# Patient Record
Sex: MALE | Race: WHITE | NOT HISPANIC OR LATINO | URBAN - METROPOLITAN AREA
[De-identification: names, ages, dates, MRNs, and addresses within clinical notes are randomized per-mention and may not be internally consistent; named-entity substitution may affect disease eponyms.]

---

## 2017-10-19 ENCOUNTER — ON CAMPUS - OUTPATIENT (AMBULATORY)
Dept: URBAN - METROPOLITAN AREA HOSPITAL 77 | Facility: HOSPITAL | Age: 72
End: 2017-10-19
Payer: MEDICARE

## 2017-10-19 DIAGNOSIS — Z12.11 ENCOUNTER FOR SCREENING FOR MALIGNANT NEOPLASM OF COLON: ICD-10-CM

## 2017-10-19 DIAGNOSIS — K64.8 OTHER HEMORRHOIDS: ICD-10-CM

## 2017-10-19 DIAGNOSIS — K57.30 DIVERTICULOSIS OF LARGE INTESTINE WITHOUT PERFORATION OR ABS: ICD-10-CM

## 2017-10-19 PROCEDURE — G0121 COLON CA SCRN NOT HI RSK IND: HCPCS | Performed by: INTERNAL MEDICINE

## 2023-07-27 ENCOUNTER — HOSPITAL ENCOUNTER (OUTPATIENT)
Dept: CARDIOLOGY | Facility: HOSPITAL | Age: 78
Discharge: HOME | End: 2023-07-27
Payer: MEDICARE

## 2023-07-27 DIAGNOSIS — L97.821 NON-PRESSURE CHRONIC ULCER OF OTHER PART OF LEFT LOWER LEG LIMITED TO BREAKDOWN OF SKIN: ICD-10-CM

## 2023-07-27 DIAGNOSIS — L97.811 NON-PRESSURE CHRONIC ULCER OF OTHER PART OF RIGHT LOWER LEG LIMITED TO BREAKDOWN OF SKIN: ICD-10-CM

## 2023-07-27 LAB
BH CV LOWER ARTERIAL LEFT ABI RATIO: NORMAL
BH CV LOWER ARTERIAL LEFT DORSALIS PEDIS SYS MAX: NORMAL
BH CV LOWER ARTERIAL LEFT GREAT TOE SYS MAX: 110
BH CV LOWER ARTERIAL LEFT POST TIBIAL SYS MAX: NORMAL
BH CV LOWER ARTERIAL LEFT TBI RATIO: 1.12
BH CV LOWER ARTERIAL RIGHT ABI RATIO: 1.69
BH CV LOWER ARTERIAL RIGHT DORSALIS PEDIS SYS MAX: 166
BH CV LOWER ARTERIAL RIGHT GREAT TOE SYS MAX: 126
BH CV LOWER ARTERIAL RIGHT POST TIBIAL SYS MAX: NORMAL
BH CV LOWER ARTERIAL RIGHT TBI RATIO: 1.29
UPPER ARTERIAL LEFT ARM BRACHIAL SYS MAX: 98 MMHG
UPPER ARTERIAL RIGHT ARM BRACHIAL SYS MAX: 95 MMHG

## 2023-07-27 PROCEDURE — 93922 UPR/L XTREMITY ART 2 LEVELS: CPT

## 2023-07-28 ENCOUNTER — OFFICE VISIT (OUTPATIENT)
Dept: WOUND CARE | Facility: HOSPITAL | Age: 78
End: 2023-07-28
Payer: MEDICARE

## 2023-07-28 PROCEDURE — G0463 HOSPITAL OUTPT CLINIC VISIT: HCPCS

## 2023-08-04 ENCOUNTER — OFFICE VISIT (OUTPATIENT)
Dept: WOUND CARE | Facility: HOSPITAL | Age: 78
End: 2023-08-04
Payer: MEDICARE

## 2023-08-04 PROCEDURE — G0463 HOSPITAL OUTPT CLINIC VISIT: HCPCS

## 2023-09-11 ENCOUNTER — OFFICE VISIT (OUTPATIENT)
Dept: WOUND CARE | Facility: HOSPITAL | Age: 78
End: 2023-09-11
Payer: MEDICARE

## 2023-09-11 PROCEDURE — G0463 HOSPITAL OUTPT CLINIC VISIT: HCPCS

## 2023-09-25 ENCOUNTER — OFFICE VISIT (OUTPATIENT)
Dept: WOUND CARE | Facility: HOSPITAL | Age: 78
End: 2023-09-25
Payer: MEDICARE

## 2023-09-25 PROCEDURE — G0463 HOSPITAL OUTPT CLINIC VISIT: HCPCS

## 2023-10-09 ENCOUNTER — OFFICE VISIT (OUTPATIENT)
Dept: WOUND CARE | Facility: HOSPITAL | Age: 78
End: 2023-10-09
Payer: MEDICARE

## 2023-10-09 DIAGNOSIS — L97.821 NON-PRESSURE CHRONIC ULCER OF OTHER PART OF LEFT LOWER LEG LIMITED TO BREAKDOWN OF SKIN: ICD-10-CM

## 2023-10-09 DIAGNOSIS — I87.2 VENOUS INSUFFICIENCY (CHRONIC) (PERIPHERAL): ICD-10-CM

## 2023-10-09 DIAGNOSIS — L97.811 NON-PRESSURE CHRONIC ULCER OF OTHER PART OF RIGHT LOWER LEG LIMITED TO BREAKDOWN OF SKIN: Primary | ICD-10-CM

## 2023-10-16 ENCOUNTER — OFFICE VISIT (OUTPATIENT)
Dept: WOUND CARE | Facility: HOSPITAL | Age: 78
End: 2023-10-16
Payer: MEDICARE

## 2023-10-30 ENCOUNTER — OFFICE VISIT (OUTPATIENT)
Dept: WOUND CARE | Facility: HOSPITAL | Age: 78
End: 2023-10-30
Payer: MEDICARE

## 2023-10-30 DIAGNOSIS — I87.2 VENOUS INSUFFICIENCY (CHRONIC) (PERIPHERAL): ICD-10-CM

## 2023-10-30 DIAGNOSIS — L97.821 NON-PRESSURE CHRONIC ULCER OF OTHER PART OF LEFT LOWER LEG LIMITED TO BREAKDOWN OF SKIN: Primary | ICD-10-CM

## 2023-11-20 ENCOUNTER — OFFICE VISIT (OUTPATIENT)
Dept: WOUND CARE | Facility: HOSPITAL | Age: 78
End: 2023-11-20
Payer: MEDICARE

## 2023-12-04 ENCOUNTER — OFFICE VISIT (OUTPATIENT)
Dept: WOUND CARE | Facility: HOSPITAL | Age: 78
End: 2023-12-04
Payer: MEDICARE

## 2023-12-04 DIAGNOSIS — I87.2 VENOUS INSUFFICIENCY (CHRONIC) (PERIPHERAL): ICD-10-CM

## 2023-12-04 DIAGNOSIS — L85.3 XEROSIS CUTIS: ICD-10-CM

## 2023-12-04 DIAGNOSIS — L97.811 NON-PRESSURE CHRONIC ULCER OF OTHER PART OF RIGHT LOWER LEG LIMITED TO BREAKDOWN OF SKIN: ICD-10-CM

## 2023-12-04 DIAGNOSIS — L97.821 NON-PRESSURE CHRONIC ULCER OF OTHER PART OF LEFT LOWER LEG LIMITED TO BREAKDOWN OF SKIN: Primary | ICD-10-CM

## 2023-12-04 PROCEDURE — 99213 OFFICE O/P EST LOW 20 MIN: CPT

## 2023-12-18 ENCOUNTER — OFFICE VISIT (OUTPATIENT)
Dept: WOUND CARE | Facility: HOSPITAL | Age: 78
End: 2023-12-18
Payer: MEDICARE

## 2024-01-10 ENCOUNTER — OFFICE VISIT (OUTPATIENT)
Dept: WOUND CARE | Facility: HOSPITAL | Age: 79
End: 2024-01-10
Payer: MEDICARE

## 2024-01-17 ENCOUNTER — OFFICE VISIT (OUTPATIENT)
Dept: WOUND CARE | Facility: HOSPITAL | Age: 79
End: 2024-01-17
Payer: MEDICARE

## 2024-02-07 ENCOUNTER — OFFICE VISIT (OUTPATIENT)
Dept: WOUND CARE | Facility: HOSPITAL | Age: 79
End: 2024-02-07
Payer: MEDICARE

## 2024-02-23 ENCOUNTER — OFFICE VISIT (OUTPATIENT)
Dept: WOUND CARE | Facility: HOSPITAL | Age: 79
End: 2024-02-23
Payer: MEDICARE

## 2024-03-08 ENCOUNTER — OFFICE VISIT (OUTPATIENT)
Dept: WOUND CARE | Facility: HOSPITAL | Age: 79
End: 2024-03-08
Payer: MEDICARE

## 2024-03-22 ENCOUNTER — OFFICE VISIT (OUTPATIENT)
Dept: WOUND CARE | Facility: HOSPITAL | Age: 79
End: 2024-03-22
Payer: MEDICARE

## 2024-04-05 ENCOUNTER — OFFICE VISIT (OUTPATIENT)
Dept: WOUND CARE | Facility: HOSPITAL | Age: 79
End: 2024-04-05
Payer: MEDICARE

## 2024-04-05 PROCEDURE — G0463 HOSPITAL OUTPT CLINIC VISIT: HCPCS

## 2024-04-19 ENCOUNTER — OFFICE VISIT (OUTPATIENT)
Dept: WOUND CARE | Facility: HOSPITAL | Age: 79
End: 2024-04-19
Payer: MEDICARE

## 2024-05-10 ENCOUNTER — OFFICE VISIT (OUTPATIENT)
Dept: WOUND CARE | Facility: HOSPITAL | Age: 79
End: 2024-05-10
Payer: MEDICARE

## 2024-05-24 ENCOUNTER — OFFICE VISIT (OUTPATIENT)
Dept: WOUND CARE | Facility: HOSPITAL | Age: 79
End: 2024-05-24
Payer: MEDICARE

## 2024-05-24 PROCEDURE — G0463 HOSPITAL OUTPT CLINIC VISIT: HCPCS

## 2024-06-14 ENCOUNTER — OFFICE VISIT (OUTPATIENT)
Dept: WOUND CARE | Facility: HOSPITAL | Age: 79
End: 2024-06-14
Payer: MEDICARE

## 2024-07-08 ENCOUNTER — HOSPITAL ENCOUNTER (INPATIENT)
Facility: HOSPITAL | Age: 79
LOS: 1 days | Discharge: HOME | End: 2024-07-10
Attending: EMERGENCY MEDICINE | Admitting: INTERNAL MEDICINE
Payer: MEDICARE

## 2024-07-08 DIAGNOSIS — R29.6 MULTIPLE FALLS: Primary | ICD-10-CM

## 2024-07-08 DIAGNOSIS — E86.0 DEHYDRATION: ICD-10-CM

## 2024-07-08 DIAGNOSIS — R26.81 UNSTEADY GAIT: ICD-10-CM

## 2024-07-08 DIAGNOSIS — R29.6 RECURRENT FALLS: ICD-10-CM

## 2024-07-08 DIAGNOSIS — R55 NEAR SYNCOPE: ICD-10-CM

## 2024-07-08 LAB
ALBUMIN SERPL-MCNC: 3.4 G/DL (ref 3.5–5.2)
ALBUMIN/GLOB SERPL: 1.1 G/DL
ALP SERPL-CCNC: 89 U/L (ref 39–117)
ALT SERPL W P-5'-P-CCNC: 23 U/L (ref 1–41)
ANION GAP SERPL CALCULATED.3IONS-SCNC: 9.5 MMOL/L (ref 5–15)
AST SERPL-CCNC: 33 U/L (ref 1–40)
BASOPHILS # BLD AUTO: 0.04 10*3/MM3 (ref 0–0.2)
BASOPHILS NFR BLD AUTO: 0.7 % (ref 0–1.5)
BILIRUB SERPL-MCNC: 1 MG/DL (ref 0–1.2)
BUN SERPL-MCNC: 22 MG/DL (ref 8–23)
BUN/CREAT SERPL: 17.1 (ref 7–25)
CALCIUM SPEC-SCNC: 8.7 MG/DL (ref 8.6–10.5)
CHLORIDE SERPL-SCNC: 106 MMOL/L (ref 98–107)
CO2 SERPL-SCNC: 24.5 MMOL/L (ref 22–29)
CREAT SERPL-MCNC: 1.29 MG/DL (ref 0.76–1.27)
DEPRECATED RDW RBC AUTO: 45.9 FL (ref 37–54)
EGFRCR SERPLBLD CKD-EPI 2021: 56.1 ML/MIN/1.73
EOSINOPHIL # BLD AUTO: 0.3 10*3/MM3 (ref 0–0.4)
EOSINOPHIL NFR BLD AUTO: 5.4 % (ref 0.3–6.2)
ERYTHROCYTE [DISTWIDTH] IN BLOOD BY AUTOMATED COUNT: 13.6 % (ref 12.3–15.4)
GEN 5 2HR TROPONIN T REFLEX: 80 NG/L
GLOBULIN UR ELPH-MCNC: 3.2 GM/DL
GLUCOSE SERPL-MCNC: 93 MG/DL (ref 65–99)
HCT VFR BLD AUTO: 41.9 % (ref 37.5–51)
HGB BLD-MCNC: 13.2 G/DL (ref 13–17.7)
IMM GRANULOCYTES # BLD AUTO: 0.02 10*3/MM3 (ref 0–0.05)
IMM GRANULOCYTES NFR BLD AUTO: 0.4 % (ref 0–0.5)
LYMPHOCYTES # BLD AUTO: 1.03 10*3/MM3 (ref 0.7–3.1)
LYMPHOCYTES NFR BLD AUTO: 18.4 % (ref 19.6–45.3)
MAGNESIUM SERPL-MCNC: 2.1 MG/DL (ref 1.6–2.4)
MCH RBC QN AUTO: 28.7 PG (ref 26.6–33)
MCHC RBC AUTO-ENTMCNC: 31.5 G/DL (ref 31.5–35.7)
MCV RBC AUTO: 91.1 FL (ref 79–97)
MONOCYTES # BLD AUTO: 0.45 10*3/MM3 (ref 0.1–0.9)
MONOCYTES NFR BLD AUTO: 8.1 % (ref 5–12)
NEUTROPHILS NFR BLD AUTO: 3.75 10*3/MM3 (ref 1.7–7)
NEUTROPHILS NFR BLD AUTO: 67 % (ref 42.7–76)
PLATELET # BLD AUTO: 157 10*3/MM3 (ref 140–450)
PMV BLD AUTO: 9.8 FL (ref 6–12)
POTASSIUM SERPL-SCNC: 3.3 MMOL/L (ref 3.5–5.2)
PROT SERPL-MCNC: 6.6 G/DL (ref 6–8.5)
QT INTERVAL: 438 MS
QTC INTERVAL: 477 MS
RBC # BLD AUTO: 4.6 10*6/MM3 (ref 4.14–5.8)
SODIUM SERPL-SCNC: 140 MMOL/L (ref 136–145)
TROPONIN T DELTA: -6 NG/L
TROPONIN T SERPL HS-MCNC: 86 NG/L
WBC NRBC COR # BLD AUTO: 5.59 10*3/MM3 (ref 3.4–10.8)

## 2024-07-08 PROCEDURE — 80053 COMPREHEN METABOLIC PANEL: CPT | Performed by: PHYSICIAN ASSISTANT

## 2024-07-08 PROCEDURE — 99284 EMERGENCY DEPT VISIT MOD MDM: CPT | Performed by: PHYSICIAN ASSISTANT

## 2024-07-08 PROCEDURE — 84484 ASSAY OF TROPONIN QUANT: CPT | Performed by: PHYSICIAN ASSISTANT

## 2024-07-08 PROCEDURE — 93005 ELECTROCARDIOGRAM TRACING: CPT | Performed by: PHYSICIAN ASSISTANT

## 2024-07-08 PROCEDURE — 36415 COLL VENOUS BLD VENIPUNCTURE: CPT

## 2024-07-08 PROCEDURE — 99285 EMERGENCY DEPT VISIT HI MDM: CPT

## 2024-07-08 PROCEDURE — 25010000002 ONDANSETRON PER 1 MG: Performed by: PHYSICIAN ASSISTANT

## 2024-07-08 PROCEDURE — 93010 ELECTROCARDIOGRAM REPORT: CPT | Performed by: PHYSICIAN ASSISTANT

## 2024-07-08 PROCEDURE — G0378 HOSPITAL OBSERVATION PER HR: HCPCS

## 2024-07-08 PROCEDURE — 85025 COMPLETE CBC W/AUTO DIFF WBC: CPT | Performed by: PHYSICIAN ASSISTANT

## 2024-07-08 PROCEDURE — 25810000003 SODIUM CHLORIDE 0.9 % SOLUTION: Performed by: EMERGENCY MEDICINE

## 2024-07-08 PROCEDURE — 25810000003 SODIUM CHLORIDE 0.9 % SOLUTION: Performed by: PHYSICIAN ASSISTANT

## 2024-07-08 PROCEDURE — 83735 ASSAY OF MAGNESIUM: CPT | Performed by: PHYSICIAN ASSISTANT

## 2024-07-08 RX ORDER — ONDANSETRON 2 MG/ML
4 INJECTION INTRAMUSCULAR; INTRAVENOUS ONCE
Status: COMPLETED | OUTPATIENT
Start: 2024-07-08 | End: 2024-07-08

## 2024-07-08 RX ADMIN — SODIUM CHLORIDE 1000 ML: 9 INJECTION, SOLUTION INTRAVENOUS at 14:24

## 2024-07-08 RX ADMIN — SODIUM CHLORIDE 1000 ML: 9 INJECTION, SOLUTION INTRAVENOUS at 12:54

## 2024-07-08 RX ADMIN — ONDANSETRON 4 MG: 2 INJECTION INTRAMUSCULAR; INTRAVENOUS at 17:36

## 2024-07-08 NOTE — ED NOTES
RN rounded on pt at this time, sister asked for pt to be given ice water, this was provided. Another family member is present at this time, asking for updates on pt condition. Education provided. Notified that we are waiting on a bed to become available at [place].

## 2024-07-08 NOTE — FSED PROVIDER NOTE
EMERGENCY DEPARTMENT ENCOUNTER    Room Number:  02/02  Date seen:  7/8/2024  Time seen: 12:49 EDT  PCP: Josey Stovall APRN  Historian: Patient    Discussed/obtained information from independent historians: N/A    HPI:  Chief complaint: Multiple falls  A complete HPI/ROS/PMH/PSH/SH/FH are unobtainable due to: Nothing  Context:Lopez Mcdaniel is a 78 y.o. male significant past medical history of lymphedema, mild dementia, lives at home alone.  He presents to the ED with c/o 3 falls that occurred in the course the past week.  Patient states that one of the occasions he is tripped over her shoelaces.  He states that the second fall he thinks he attempted to stand up too quickly and became lightheaded causing him to fall.  Denies having a LOC with a lightheaded episode.  3 days ago.  The states he did hit his head in the process of the fall 3 days ago.  There was no LOC with head trauma, no nausea, no vomiting.  There was no chest pain or palpitations associated with the dizzy spell.  Daughter is present and states the patient appears to be at his baseline mentally.  He is currently not on any anticoagulant therapy.  He denies sustaining injury to the abdomen, chest wall, neck, mid back, lower back, ribs, upper and lower extremities with this fall.      External (non-ED) record review: Reviewing patient's chart he appears to be followed by the 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  His next appointment with the wound clinic is in 4 days.    Chronic or social conditions impacting care:    ALLERGIES  Sulfa antibiotics, Latex, and Wound dressing adhesive    PAST MEDICAL HISTORY  Active Ambulatory Problems     Diagnosis Date Noted    No Active Ambulatory Problems     Resolved Ambulatory Problems     Diagnosis Date Noted    No Resolved Ambulatory Problems     No Additional Past Medical History       PAST SURGICAL HISTORY  History reviewed. No  pertinent surgical history.    FAMILY HISTORY  History reviewed. No pertinent family history.    SOCIAL HISTORY  Social History     Socioeconomic History    Marital status:        REVIEW OF SYSTEMS  Review of Systems    All systems reviewed and negative except for those discussed in HPI.     PHYSICAL EXAM    I have reviewed the triage vital signs and nursing notes.  Vitals:    07/08/24 1259   BP: 99/65   Pulse: 93   Resp:    Temp:    SpO2:      Physical Exam    GENERAL: Frail, alert   HENT: Mucous membranes dry, abrasion to the right aspect of the forehead.  EYES: no scleral icterus, no hyphema or proptosis  NECK: no ROM limitations  CV: regular rhythm, regular rate  RESPIRATORY: normal effort  ABDOMEN: soft nontender  : deferred  MUSCULOSKELETAL: Moves all extremities without pain or difficulty.  NEURO: alert x 3, moves all extremities, follows commands.  SKIN: Forehead abrasion as above.  Unna boots in place bilateral lower extremity    LAB RESULTS  Recent Results (from the past 24 hour(s))   ECG 12 Lead Syncope    Collection Time: 07/08/24 12:50 PM   Result Value Ref Range    QT Interval 438 ms    QTC Interval 477 ms   Comprehensive Metabolic Panel    Collection Time: 07/08/24 12:51 PM    Specimen: Blood   Result Value Ref Range    Glucose 93 65 - 99 mg/dL    BUN 22 8 - 23 mg/dL    Creatinine 1.29 (H) 0.76 - 1.27 mg/dL    Sodium 140 136 - 145 mmol/L    Potassium 3.3 (L) 3.5 - 5.2 mmol/L    Chloride 106 98 - 107 mmol/L    CO2 24.5 22.0 - 29.0 mmol/L    Calcium 8.7 8.6 - 10.5 mg/dL    Total Protein 6.6 6.0 - 8.5 g/dL    Albumin 3.4 (L) 3.5 - 5.2 g/dL    ALT (SGPT) 23 1 - 41 U/L    AST (SGOT) 33 1 - 40 U/L    Alkaline Phosphatase 89 39 - 117 U/L    Total Bilirubin 1.0 0.0 - 1.2 mg/dL    Globulin 3.2 gm/dL    A/G Ratio 1.1 g/dL    BUN/Creatinine Ratio 17.1 7.0 - 25.0    Anion Gap 9.5 5.0 - 15.0 mmol/L    eGFR 56.1 (L) >60.0 mL/min/1.73   High Sensitivity Troponin T    Collection Time: 07/08/24 12:51 PM     Specimen: Blood   Result Value Ref Range    HS Troponin T 86 (C) <22 ng/L   Magnesium    Collection Time: 07/08/24 12:51 PM    Specimen: Blood   Result Value Ref Range    Magnesium 2.1 1.6 - 2.4 mg/dL   CBC Auto Differential    Collection Time: 07/08/24 12:51 PM    Specimen: Blood   Result Value Ref Range    WBC 5.59 3.40 - 10.80 10*3/mm3    RBC 4.60 4.14 - 5.80 10*6/mm3    Hemoglobin 13.2 13.0 - 17.7 g/dL    Hematocrit 41.9 37.5 - 51.0 %    MCV 91.1 79.0 - 97.0 fL    MCH 28.7 26.6 - 33.0 pg    MCHC 31.5 31.5 - 35.7 g/dL    RDW 13.6 12.3 - 15.4 %    RDW-SD 45.9 37.0 - 54.0 fl    MPV 9.8 6.0 - 12.0 fL    Platelets 157 140 - 450 10*3/mm3    Neutrophil % 67.0 42.7 - 76.0 %    Lymphocyte % 18.4 (L) 19.6 - 45.3 %    Monocyte % 8.1 5.0 - 12.0 %    Eosinophil % 5.4 0.3 - 6.2 %    Basophil % 0.7 0.0 - 1.5 %    Immature Grans % 0.4 0.0 - 0.5 %    Neutrophils, Absolute 3.75 1.70 - 7.00 10*3/mm3    Lymphocytes, Absolute 1.03 0.70 - 3.10 10*3/mm3    Monocytes, Absolute 0.45 0.10 - 0.90 10*3/mm3    Eosinophils, Absolute 0.30 0.00 - 0.40 10*3/mm3    Basophils, Absolute 0.04 0.00 - 0.20 10*3/mm3    Immature Grans, Absolute 0.02 0.00 - 0.05 10*3/mm3       Ordered the above labs and independently interpreted results.  My findings will be discussed in the ED course or medical decision making section below    RADIOLOGY RESULTS  No Radiology Exams Resulted Within Past 24 Hours     Ordered the above noted radiological studies.  Independently interpreted by me.  My findings will be discussed in the medical decision section below.     PROGRESS, DATA ANALYSIS, CONSULTS AND MEDICAL DECISION MAKING    Please note that this section constitutes my independent interpretation of clinical data including lab results, radiology, EKG's.  This constitutes my independent professional opinion regarding differential diagnosis and management of this patient.  It may include any factors such as history from outside sources, review of external records,  social determinants of health, management of medications, response to those treatments, and discussions with other providers.    ED Course as of 07/08/24 1403   Mon Jul 08, 2024   1358 WBC: 5.59 [RC]   1358 RBC: 4.60 [RC]   1358 Hemoglobin: 13.2 [RC]   1358 Hematocrit: 41.9 [RC]   1358 Platelets: 157 [RC]   1358 HS Troponin T(!!): 86 [RC]   1358 Glucose: 93 [RC]   1358 BUN: 22 [RC]   1358 Creatinine(!): 1.29 [RC]   1358 Sodium: 140 [RC]   1358 Potassium(!): 3.3 [RC]   1358 CO2: 24.5 [RC]   1358 Anion Gap: 9.5 [RC]   1358 Magnesium: 2.1 [RC]   1358 Patient does appear dry.  Suspect this is the cause of his near syncopal episodes.  Either way the patient has fallen at least 3 times over the course the past week.  He could stand to be rehydrated, reassess, and potentially evaluated by cardiology as seen fit by the hospitalist.   [RC]   1400 Working diagnosis will be multiple falls, near syncope, dehydration. [RC]      ED Course User Index  [RC] Edenilson Alberto III, PA     Orders placed during this visit:  Orders Placed This Encounter   Procedures    Comprehensive Metabolic Panel    High Sensitivity Troponin T    Magnesium    CBC Auto Differential    High Sensitivity Troponin T 2Hr    Orthostatic Vitals (Blood Pressure & Heart Rate)    ECG 12 Lead Syncope    Initiate Observation Status    Tele Bed Request ([facility] / [facility] / [facility] ONLY)    CBC & Differential    ED Acknowledgement Form Needed;            Medical Decision Making  Amount and/or Complexity of Data Reviewed  Labs: ordered.  ECG/medicine tests: ordered.      Mechanical fall, near syncope secondary to dehydration, orthostatic syncope, hyponatremia/other electrolyte disturbance, PE, arrhythmia/cardiogenic syncope, CNS related syncope.  Patient sats are 98 to 99% heart rate is in the 60s.  There is no chest pain or shortness of breath.  Clinical picture not consistent with a PE.  Will obtain CBC, CMP, troponin x 2, EKG, magnesium chest x-ray, orthostatics further  evaluate the patient.  I suspect the cause of his lightheadedness are due to being dry and standing up too quickly.  Will go ahead and administer IV fluid as well.      DIAGNOSIS  Final diagnoses:   Multiple falls   Near syncope   Dehydration          Medication List      No changes were made to your prescriptions during this visit.         FOLLOW-UP  No follow-up provider specified.      Latest Documented Vital Signs:  As of 14:03 EDT  BP- 99/65 HR- 93 Temp- 98 °F (36.7 °C) (Oral) O2 sat- 98%    Appropriate PPE utilized throughout this patient encounter to include mask, if indicated, per current protocol. Hand hygiene was performed before donning PPE and after removal when leaving the room.    Please note that portions of this were completed with a voice recognition program.     Note Disclaimer: At [facility], we believe that sharing information builds trust and better relationships. You are receiving this note because you are receiving care at [facility] or recently visited. It is possible you will see health information before a provider has talked with you about it. This kind of information can be easy to misunderstand. To help you fully understand what it means for your health, we urge you to discuss this note with your provider.

## 2024-07-08 NOTE — ED NOTES
Pt's sister asked if he could have something to eat, confirmed with Jani RICKETTS that this is ok. Pt's sister going to leave and come back with food for him. Pt and his sister updated on admission status and notified that we are waiting on a bed assignment, opportunity for questions presented, none asked.

## 2024-07-08 NOTE — Clinical Note
Level of Care: Telemetry [5]   Diagnosis: Near syncope [128191]   Admitting Physician: HAKAN LAURA [[id number]]   Attending Physician: HAKAN LAURA [[id number]]

## 2024-07-09 ENCOUNTER — APPOINTMENT (OUTPATIENT)
Dept: CT IMAGING | Facility: HOSPITAL | Age: 79
End: 2024-07-09
Payer: MEDICARE

## 2024-07-09 ENCOUNTER — APPOINTMENT (OUTPATIENT)
Dept: GENERAL RADIOLOGY | Facility: HOSPITAL | Age: 79
End: 2024-07-09
Payer: MEDICARE

## 2024-07-09 PROBLEM — R26.81 UNSTEADY GAIT: Status: ACTIVE | Noted: 2024-07-09

## 2024-07-09 PROBLEM — R29.6 RECURRENT FALLS: Status: ACTIVE | Noted: 2024-07-09

## 2024-07-09 LAB
ANION GAP SERPL CALCULATED.3IONS-SCNC: 9.9 MMOL/L (ref 5–15)
BUN SERPL-MCNC: 14 MG/DL (ref 8–23)
BUN/CREAT SERPL: 14.4 (ref 7–25)
CALCIUM SPEC-SCNC: 8.2 MG/DL (ref 8.6–10.5)
CHLORIDE SERPL-SCNC: 108 MMOL/L (ref 98–107)
CO2 SERPL-SCNC: 24.1 MMOL/L (ref 22–29)
CREAT SERPL-MCNC: 0.97 MG/DL (ref 0.76–1.27)
EGFRCR SERPLBLD CKD-EPI 2021: 78.9 ML/MIN/1.73
GLUCOSE SERPL-MCNC: 79 MG/DL (ref 65–99)
POTASSIUM SERPL-SCNC: 4.3 MMOL/L (ref 3.5–5.2)
SODIUM SERPL-SCNC: 142 MMOL/L (ref 136–145)

## 2024-07-09 PROCEDURE — 71045 X-RAY EXAM CHEST 1 VIEW: CPT

## 2024-07-09 PROCEDURE — 70450 CT HEAD/BRAIN W/O DYE: CPT

## 2024-07-09 PROCEDURE — 72125 CT NECK SPINE W/O DYE: CPT

## 2024-07-09 PROCEDURE — 25010000002 ENOXAPARIN PER 10 MG: Performed by: STUDENT IN AN ORGANIZED HEALTH CARE EDUCATION/TRAINING PROGRAM

## 2024-07-09 PROCEDURE — 72170 X-RAY EXAM OF PELVIS: CPT

## 2024-07-09 PROCEDURE — 25810000003 SODIUM CHLORIDE 0.9 % SOLUTION: Performed by: STUDENT IN AN ORGANIZED HEALTH CARE EDUCATION/TRAINING PROGRAM

## 2024-07-09 PROCEDURE — 80048 BASIC METABOLIC PNL TOTAL CA: CPT | Performed by: INTERNAL MEDICINE

## 2024-07-09 PROCEDURE — 97166 OT EVAL MOD COMPLEX 45 MIN: CPT | Performed by: OCCUPATIONAL THERAPIST

## 2024-07-09 PROCEDURE — 97161 PT EVAL LOW COMPLEX 20 MIN: CPT

## 2024-07-09 RX ORDER — SODIUM CHLORIDE 9 MG/ML
40 INJECTION, SOLUTION INTRAVENOUS AS NEEDED
Status: DISCONTINUED | OUTPATIENT
Start: 2024-07-09 | End: 2024-07-10 | Stop reason: HOSPADM

## 2024-07-09 RX ORDER — SODIUM CHLORIDE 0.9 % (FLUSH) 0.9 %
10 SYRINGE (ML) INJECTION EVERY 12 HOURS SCHEDULED
Status: DISCONTINUED | OUTPATIENT
Start: 2024-07-09 | End: 2024-07-10 | Stop reason: HOSPADM

## 2024-07-09 RX ORDER — SODIUM CHLORIDE 0.9 % (FLUSH) 0.9 %
10 SYRINGE (ML) INJECTION AS NEEDED
Status: DISCONTINUED | OUTPATIENT
Start: 2024-07-09 | End: 2024-07-10 | Stop reason: HOSPADM

## 2024-07-09 RX ORDER — SODIUM CHLORIDE 9 MG/ML
75 INJECTION, SOLUTION INTRAVENOUS CONTINUOUS
Status: DISPENSED | OUTPATIENT
Start: 2024-07-09 | End: 2024-07-10

## 2024-07-09 RX ORDER — POLYETHYLENE GLYCOL 3350 17 G/17G
17 POWDER, FOR SOLUTION ORAL DAILY PRN
Status: DISCONTINUED | OUTPATIENT
Start: 2024-07-09 | End: 2024-07-10 | Stop reason: HOSPADM

## 2024-07-09 RX ORDER — POTASSIUM CHLORIDE 20 MEQ/1
40 TABLET, EXTENDED RELEASE ORAL EVERY 4 HOURS
Status: COMPLETED | OUTPATIENT
Start: 2024-07-09 | End: 2024-07-09

## 2024-07-09 RX ORDER — ENOXAPARIN SODIUM 100 MG/ML
40 INJECTION SUBCUTANEOUS EVERY 24 HOURS
Status: DISCONTINUED | OUTPATIENT
Start: 2024-07-09 | End: 2024-07-10 | Stop reason: HOSPADM

## 2024-07-09 RX ORDER — AMOXICILLIN 250 MG
2 CAPSULE ORAL 2 TIMES DAILY PRN
Status: DISCONTINUED | OUTPATIENT
Start: 2024-07-09 | End: 2024-07-10 | Stop reason: HOSPADM

## 2024-07-09 RX ORDER — BISACODYL 5 MG/1
5 TABLET, DELAYED RELEASE ORAL DAILY PRN
Status: DISCONTINUED | OUTPATIENT
Start: 2024-07-09 | End: 2024-07-10 | Stop reason: HOSPADM

## 2024-07-09 RX ORDER — BISACODYL 10 MG
10 SUPPOSITORY, RECTAL RECTAL DAILY PRN
Status: DISCONTINUED | OUTPATIENT
Start: 2024-07-09 | End: 2024-07-10 | Stop reason: HOSPADM

## 2024-07-09 RX ADMIN — Medication 10 ML: at 20:48

## 2024-07-09 RX ADMIN — ENOXAPARIN SODIUM 40 MG: 100 INJECTION SUBCUTANEOUS at 17:00

## 2024-07-09 RX ADMIN — Medication 10 ML: at 09:43

## 2024-07-09 RX ADMIN — POTASSIUM CHLORIDE 40 MEQ: 1500 TABLET, EXTENDED RELEASE ORAL at 05:44

## 2024-07-09 RX ADMIN — POTASSIUM CHLORIDE 40 MEQ: 1500 TABLET, EXTENDED RELEASE ORAL at 09:43

## 2024-07-09 RX ADMIN — SODIUM CHLORIDE 75 ML/HR: 9 INJECTION, SOLUTION INTRAVENOUS at 05:44

## 2024-07-09 NOTE — H&P
"[facility] Medicine Services  History & Physical    Patient Name: Lopez Mcdaniel  : 1945  MRN: [id number]  Primary Care Physician:  Josey Stovall APRN  Date of admission: 2024  Date and Time of Service: 2024 at 3:20 am    Subjective      Chief Complaint: \"fall\"    History of Present Illness: Lopez Mcdaniel is a 78 y.o. male with a PMH of Dementia, PVD, bilateral knee OA, prostate cancer, lymphedema, GERD who presented to [facility] on 2024 with after having several falls over the past 3 days. Patient is reporting feeling increasingly tired since last week, feeling lightheaded, he is not sure if he lost consciousness. Patient reportedly hit his head onto a table 3 days ago and developed a small laceration right periorbital area. Patient was initially evaluated at [facility] ED, workup over there did not include CT head, labs CBC grossly unremarkable, chemistry notable for creat 1.29, K 3.3, HS trop elevated 86 repeat down trended 80, EKG in ED showed low voltage, PVCs. The patient was subsequently transferred here with a working diagnosis of near syncope, recurrent falls.       Review of Systems   Constitutional:  Positive for fatigue. Negative for appetite change and fever.   HENT:  Negative for ear discharge, ear pain, nosebleeds, postnasal drip and rhinorrhea.    Eyes:  Negative for pain.   Respiratory:  Negative for chest tightness and shortness of breath.    Cardiovascular:  Negative for leg swelling.   Gastrointestinal:  Negative for abdominal pain.   Endocrine: Negative for polydipsia.   Genitourinary:  Negative for enuresis and penile swelling.   Musculoskeletal:  Positive for gait problem and joint swelling. Negative for back pain.   Skin:  Negative for pallor.   Neurological:  Negative for seizures and headaches.   Psychiatric/Behavioral:  Negative for behavioral problems.        Personal History     History reviewed. No pertinent past medical history.    History reviewed. " No pertinent surgical history.    Family History: family history is not on file. Otherwise pertinent FHx was reviewed and not pertinent to current issue.    Social History:  reports that he has never smoked. He has never been exposed to tobacco smoke. He has never used smokeless tobacco. He reports that he does not drink alcohol and does not use drugs.    Home Medications:  Prior to Admission Medications       None              Allergies:  Allergies   Allergen Reactions    Sulfa Antibiotics Nausea Only    Latex Rash    Wound Dressing Adhesive Rash       Objective      Vitals:   Temp:  [97 °F (36.1 °C)-98 °F (36.7 °C)] 97.5 °F (36.4 °C)  Heart Rate:  [57-93] 57  Resp:  [13-20] 13  BP: ()/(64-90) 117/65  Body mass index is 20.79 kg/m².  Physical Exam  Constitutional:       General: He is not in acute distress.     Appearance: Normal appearance.   HENT:      Head:      Comments: Mild bruising, excoriation right periorbital area. Temporal wasting.      Mouth/Throat:      Mouth: Mucous membranes are moist.      Pharynx: No oropharyngeal exudate.   Eyes:      Extraocular Movements: Extraocular movements intact.      Pupils: Pupils are equal, round, and reactive to light.   Cardiovascular:      Rate and Rhythm: Normal rate and regular rhythm.      Pulses: Normal pulses.   Pulmonary:      Effort: Pulmonary effort is normal.   Abdominal:      General: Abdomen is flat.      Palpations: Abdomen is soft.   Musculoskeletal:         General: Signs of injury present. Normal range of motion.      Cervical back: Neck supple.      Right lower leg: Edema present.      Left lower leg: Edema present.      Comments: Bilateral Knees: Old healed midline surgical incisions. Small bilateral excoriations, no active bleeding. Mildly restricted ROM due to pain.    Skin:     General: Skin is warm and dry.      Capillary Refill: Capillary refill takes less than 2 seconds.      Coloration: Skin is pale.      Findings: Bruising present.    Neurological:      General: No focal deficit present.      Mental Status: He is alert.      Cranial Nerves: No cranial nerve deficit.      Motor: No weakness.      Comments: Alert and awake, oriented to self and space. No facial asymmetry. Moves all four extremities spontaneously. Strength 5/5 bilateral UE/LE.    Psychiatric:         Mood and Affect: Mood normal.         Behavior: Behavior normal.         Diagnostic Data:  Lab Results (last 24 hours)       Procedure Component Value Units Date/Time    High Sensitivity Troponin T 2Hr [[id number]]  (Abnormal) Collected: 07/08/24 1429    Specimen: Blood Updated: 07/08/24 1533     HS Troponin T 80 ng/L      Troponin T Delta -6 ng/L     Narrative:      High Sensitive Troponin T Reference Range:  <14.0 ng/L- Negative Female for AMI  <22.0 ng/L- Negative Male for AMI  >=14 - Abnormal Female indicating possible myocardial injury.  >=22 - Abnormal Male indicating possible myocardial injury.   Clinicians would have to utilize clinical acumen, EKG, Troponin, and serial changes to determine if it is an Acute Myocardial Infarction or myocardial injury due to an underlying chronic condition.         Comprehensive Metabolic Panel [[id number]]  (Abnormal) Collected: 07/08/24 1251    Specimen: Blood Updated: 07/08/24 1316     Glucose 93 mg/dL      BUN 22 mg/dL      Creatinine 1.29 mg/dL      Sodium 140 mmol/L      Potassium 3.3 mmol/L      Comment: Slight hemolysis detected by analyzer. Result may be falsely elevated.        Chloride 106 mmol/L      CO2 24.5 mmol/L      Calcium 8.7 mg/dL      Total Protein 6.6 g/dL      Albumin 3.4 g/dL      ALT (SGPT) 23 U/L      AST (SGOT) 33 U/L      Alkaline Phosphatase 89 U/L      Total Bilirubin 1.0 mg/dL      Globulin 3.2 gm/dL      A/G Ratio 1.1 g/dL      BUN/Creatinine Ratio 17.1     Anion Gap 9.5 mmol/L      eGFR 56.1 mL/min/1.73     Narrative:      GFR Normal >60  Chronic Kidney Disease <60  Kidney Failure <15    The GFR formula is only  valid for adults with stable renal function between ages 18 and 70.    Magnesium [[id number]]  (Normal) Collected: 07/08/24 1251    Specimen: Blood Updated: 07/08/24 1316     Magnesium 2.1 mg/dL     High Sensitivity Troponin T [[id number]]  (Abnormal) Collected: 07/08/24 1251    Specimen: Blood Updated: 07/08/24 1315     HS Troponin T 86 ng/L     Narrative:      High Sensitive Troponin T Reference Range:  <14.0 ng/L- Negative Female for AMI  <22.0 ng/L- Negative Male for AMI  >=14 - Abnormal Female indicating possible myocardial injury.  >=22 - Abnormal Male indicating possible myocardial injury.   Clinicians would have to utilize clinical acumen, EKG, Troponin, and serial changes to determine if it is an Acute Myocardial Infarction or myocardial injury due to an underlying chronic condition.         CBC & Differential [[id number]]  (Abnormal) Collected: 07/08/24 1251    Specimen: Blood Updated: 07/08/24 1255    Narrative:      The following orders were created for panel order CBC & Differential.  Procedure                               Abnormality         Status                     ---------                               -----------         ------                     CBC Auto Differential[[id number]]        Abnormal            Final result                 Please view results for these tests on the individual orders.    CBC Auto Differential [[id number]]  (Abnormal) Collected: 07/08/24 1251    Specimen: Blood Updated: 07/08/24 1255     WBC 5.59 10*3/mm3      RBC 4.60 10*6/mm3      Hemoglobin 13.2 g/dL      Hematocrit 41.9 %      MCV 91.1 fL      MCH 28.7 pg      MCHC 31.5 g/dL      RDW 13.6 %      RDW-SD 45.9 fl      MPV 9.8 fL      Platelets 157 10*3/mm3      Neutrophil % 67.0 %      Lymphocyte % 18.4 %      Monocyte % 8.1 %      Eosinophil % 5.4 %      Basophil % 0.7 %      Immature Grans % 0.4 %      Neutrophils, Absolute 3.75 10*3/mm3      Lymphocytes, Absolute 1.03 10*3/mm3      Monocytes, Absolute 0.45 10*3/mm3       Eosinophils, Absolute 0.30 10*3/mm3      Basophils, Absolute 0.04 10*3/mm3      Immature Grans, Absolute 0.02 10*3/mm3              Imaging Results (Last 24 Hours)       ** No results found for the last 24 hours. **              Assessment & Plan        This is a 78 y.o. male with:    Active and Resolved Problems  Active Hospital Problems    Diagnosis  POA    **Near syncope [R55]  Yes      Resolved Hospital Problems   No resolved problems to display.     Recurrent falls  Unsteady gait  Near syncope episode  History of Alzheimer's dementia  Obtain CT head, CT C spine, apparently CT scan is down at [facility]  Pelvis X ray  Gentle IV fluid hydration  Baxter fall precautions  Check orthostatic vitals  PT/OT  CM/SS intervention for safe discharge planning. Patient will probably require placement at SNF.      Elevated troponin, likely demand ischemia in the setting of dehydration  Hs trop down trended 86-->80  Patient is chest pain free    History of lymphedema  Elevate legs  Patient follows outpatient with lymphedema clinic    History of prostate cancer  Pending medication reconciliation      VTE Prophylaxis:  No VTE prophylaxis order currently exists.        The patient desires to be as follows:    CODE STATUS:           Admission Status:  I believe this patient meets inpatient status.    Expected Length of Stay: 2 days    PDMP and Medication Dispenses via Sidebar reviewed and consistent with patient reported medications.    I discussed the patient's findings and my recommendations with patient.      Signature:     This document has been electronically signed by Carlos Llanes Alvarez, MD on July 9, 2024 03:19 EDT   [facility] Hospitalist Team

## 2024-07-09 NOTE — DISCHARGE PLACEMENT REQUEST
"Enrico Guevara \"Bennett\" (78 y.o. Male)       Date of Birth   1945    Social Security Number       Address   [address] [place] [address]    Home Phone   [phone]    MRN   [id number]       [facility]    Marital Status                               Admission Date   7/8/24    Admission Type   Urgent    Admitting Provider   Mark Méndez MD    Attending Provider   Lori Manrique MD    Department, Room/Bed   [facility] 3A MEDICAL INPATIENT, 345/1       Discharge Date       Discharge Disposition       Discharge Destination                                 Attending Provider: Lori Manrique MD    Allergies: Sulfa Antibiotics, Latex, Wound Dressing Adhesive    Isolation: None   Infection: None   Code Status: Not on file    Ht: 177.8 cm (70\")   Wt: 65.7 kg (144 lb 14.4 oz)    Admission Cmt: None   Principal Problem: Near syncope [R55]                   Active Insurance as of 7/8/2024       Primary Coverage       Payor Plan Insurance Group Employer/Plan Group    MEDICARE MEDICARE A & B        Payor Plan Address Payor Plan Phone Number Payor Plan Fax Number Effective Dates    [address] [phone]  9/1/2008 - None Entered    [facility] [id number]         Subscriber Name Subscriber Birth Date Member ID       ENRICO GUEVARA 1945 [id number]               Secondary Coverage       Payor Plan Insurance Group Employer/Plan Group    ANTHEM BLUE CROSS ANTHEM BLUE CROSS BLUE SHIELD PPO [id number]       Payor Plan Address Payor Plan Phone Number Payor Plan Fax Number Effective Dates    [address] [phone]  1/1/2020 - None Entered    [facility] [id number]         Subscriber Name Subscriber Birth Date Member ID       ENRICO GUEVARA 1945 [id number]                     Emergency Contacts        (Rel.) Home Phone Work Phone Mobile Phone    MELLY GUEVARA (Sister) -- -- [phone]    KirbyFanny vann (Daughter) -- -- [phone]                "

## 2024-07-09 NOTE — PLAN OF CARE
Goal Outcome Evaluation:         No complaints at this time will continue to monitor.

## 2024-07-09 NOTE — THERAPY EVALUATION
Patient Name: Lopez Mcdaniel  : 1945    MRN: [id number]                              Today's Date: 2024       Admit Date: 2024    Visit Dx:     ICD-10-CM ICD-9-CM   1. Multiple falls  R29.6 V15.88   2. Near syncope  R55 780.2   3. Dehydration  E86.0 276.51     Patient Active Problem List   Diagnosis    Near syncope    Recurrent falls    Unsteady gait     History reviewed. No pertinent past medical history.  History reviewed. No pertinent surgical history.   General Information       Row Name 24 1237          OT Time and Intention    Document Type evaluation  -DT     Mode of Treatment occupational therapy  -DT       Row Name 24 1237          General Information    Patient Profile Reviewed yes  -DT     Prior Level of Function independent:;all household mobility;ADL's;using stairs  Uses a rollator  -DT     Existing Precautions/Restrictions fall  -DT     Barriers to Rehab cognitive status;environmental barriers  -DT       Row Name 24 1237          Living Environment    People in Home spouse;grandchild(julián);child(julián), adult  -DT       Row Name 24 1237          Home Main Entrance    Number of Stairs, Main Entrance none  can enter his portion of house at ground level. Does have a slope to access driveway & uses rollator.  -DT       Row Name 24 1237          Stairs Within Home, Primary    Stairs, Within Home, Primary Lives in tri-level home. Has approx 6-7 steps up to kitchen & bathroom  -DT     Number of Stairs, Within Home, Primary seven  -DT     Stair Railings, Within Home, Primary railings safe and in good condition  -DT       Row Name 24 1237          Safety Issues, Functional Mobility    Impairments Affecting Function (Mobility) endurance/activity tolerance;postural/trunk control;cognition  -DT     Cognitive Impairments, Mobility Safety/Performance judgment;problem-solving/reasoning  -DT               User Key  (r) = Recorded By, (t) = Taken By, (c) = Cosigned By       Initials Name Provider Type    DT Ro Ventura, OT Occupational Therapist                     Mobility/ADL's       Row Name 07/09/24 1239          Bed Mobility    Bed Mobility supine-sit  -DT     Supine-Sit Abbott (Bed Mobility) minimum assist (75% patient effort)  -DT     Assistive Device (Bed Mobility) draw sheet  -DT       Row Name 07/09/24 1239          Bed-Chair Transfer    Bed-Chair Abbott (Transfers) contact guard  -DT     Assistive Device (Bed-Chair Transfers) walker, front-wheeled  -DT       Row Name 07/09/24 1239          Sit-Stand Transfer    Sit-Stand Abbott (Transfers) contact guard  -DT     Assistive Device (Sit-Stand Transfers) walker, front-wheeled  -DT       Row Name 07/09/24 1239          Functional Mobility    Functional Mobility- Ind. Level contact guard assist  -DT     Functional Mobility- Device walker, front-wheeled  -DT     Functional Mobility- Comment Pt ambulates with flexed posture.  -DT     Patient was able to Ambulate yes  -DT       Row Name 07/09/24 1239          Activities of Daily Living    BADL Assessment/Intervention lower body dressing  -DT       Row Name 07/09/24 1239          Lower Body Dressing Assessment/Training    Abbott Level (Lower Body Dressing) lower body dressing skills;minimum assist (75% patient effort)  -DT     Position (Lower Body Dressing) edge of bed sitting  -DT               User Key  (r) = Recorded By, (t) = Taken By, (c) = Cosigned By      Initials Name Provider Type    DT Ro Ventura, OT Occupational Therapist                   Obj/Interventions       Row Name 07/09/24 1242          Range of Motion Comprehensive    General Range of Motion upper extremity range of motion deficits identified  -DT     Comment, General Range of Motion Pt has approx 25% bilateral shoulder flex limitations  -DT       Row Name 07/09/24 1242          Strength Comprehensive (MMT)    General Manual Muscle Testing (MMT) Assessment upper  extremity strength deficits identified  -DT     Comment, General Manual Muscle Testing (MMT) Assessment BUE= 4-/5  -DT       Row Name 07/09/24 1242          Balance    Balance Assessment sitting static balance;sitting dynamic balance;standing static balance;standing dynamic balance  -DT     Static Sitting Balance independent  -DT     Dynamic Sitting Balance supervision  -DT     Position, Sitting Balance sitting edge of bed  -DT     Static Standing Balance supervision  -DT     Dynamic Standing Balance standby assist  -DT     Position/Device Used, Standing Balance walker, front-wheeled  -DT               User Key  (r) = Recorded By, (t) = Taken By, (c) = Cosigned By      Initials Name Provider Type    DT Ro Ventura, OT Occupational Therapist                   Goals/Plan       Row Name 07/09/24 1315          Transfer Goal 1 (OT)    Activity/Assistive Device (Transfer Goal 1, OT) transfers, all  -DT     [place] Level/Cues Needed (Transfer Goal 1, OT) modified independence  -DT     Time Frame (Transfer Goal 1, OT) 2 weeks  -DT       Row Name 07/09/24 1315          Bathing Goal 1 (OT)    Activity/Device (Bathing Goal 1, OT) bathing skills, all  -DT     [place] Level/Cues Needed (Bathing Goal 1, OT) modified independence  -DT     Time Frame (Bathing Goal 1, OT) 2 weeks  -DT       Row Name 07/09/24 1315          Dressing Goal 1 (OT)    Activity/Device (Dressing Goal 1, OT) dressing skills, all  -DT     [place]/Cues Needed (Dressing Goal 1, OT) modified independence  -DT     Time Frame (Dressing Goal 1, OT) 2 weeks  -DT       Row Name 07/09/24 1315          Toileting Goal 1 (OT)    Activity/Device (Toileting Goal 1, OT) toileting skills, all  -DT     [place] Level/Cues Needed (Toileting Goal 1, OT) modified independence  -DT     Time Frame (Toileting Goal 1, OT) 2 weeks  -DT       Row Name 07/09/24 1315          Therapy Assessment/Plan (OT)    Planned Therapy Interventions (OT) activity  tolerance training;BADL retraining;neuromuscular control/coordination retraining;functional balance retraining;occupation/activity based interventions;patient/caregiver education/training;strengthening exercise;transfer/mobility retraining;ROM/therapeutic exercise;cognitive/visual perception retraining  -DT               User Key  (r) = Recorded By, (t) = Taken By, (c) = Cosigned By      Initials Name Provider Type    DT oR Vnetura, OT Occupational Therapist                   Clinical Impression       Row Name 07/09/24 1255          Pain Scale: FACES Pre/Post-Treatment    Pain: FACES Scale, Pretreatment 2-->hurts little bit  -DT     Posttreatment Pain Rating 2-->hurts little bit  -DT     Pre/Posttreatment Pain Comment wound of left elbow & right eye from recent fall  -DT       Row Name 07/09/24 6158          Plan of Care Review    Outcome Evaluation Pt is an 78 y.o. male with a PMH of Dementia, PVD, bilateral knee OA, prostate cancer, lymphedema, GERD who presented to [facility] on 7/8/2024 after having several falls over the past 3 days. Patient c/o fatigue & light-headedness prior to falling. At baseline pt lives in tri-level home. He primarily stays on the lower floor, but has to go upstairs to bathroom & kitchen. His spouse, son & grandson also live in the home, but they live upstairs. Pt reports he is ind with BADLs & uses a rollator for ambulation. He has no steps to go outdoors, but does have a steep hill to access the driveway. Pt does not drive. Upon eval, pt  is A&O X3. He required v.c. for proper month. He repeats himself at times throughout the session with noted mild STM deficits. Pt completes bed mobility with min A, ADL transfers with CGA using RW & requires LB dressing with min A. Pt presents with forward flexion posturing in standing & has fair+ standing balance & tolerance. OT will continue to follow for tx. Spoke with pt's sister & stressed concern about pt's ability to  ascend/descend steps safely & she stated she can make arrangements with pt's dtr/son at home for pt to function on one level for a brief time to allow him to safely return home & begin home health treatment to assess & tx him in his home environment. Will recommend home with HH upon discharge as long as he can function from 1 level otherwise he may need a short term rehab stay.  -DT       Row Name 07/09/24 1255          Therapy Assessment/Plan (OT)    Rehab Potential (OT) good, to achieve stated therapy goals  -DT     Criteria for Skilled Therapeutic Interventions Met (OT) meets criteria;skilled treatment is necessary  -DT     Therapy Frequency (OT) 3 times/wk  -DT     Predicted Duration of Therapy Intervention (OT) until d/c  -DT       Row Name 07/09/24 1255          Therapy Plan Review/Discharge Plan (OT)    Anticipated Discharge Disposition (OT) home with assist;home with home health  -DT       Row Name 07/09/24 1255          Vital Signs    Pre Systolic BP Rehab 138  sit  -DT     Pre Treatment Diastolic BP 71  -DT     Intra Systolic BP Rehab 124  stand  -DT     Intra Treatment Diastolic BP 72  -DT       Row Name 07/09/24 1255          Positioning and Restraints    Pre-Treatment Position in bed  -DT     Post Treatment Position chair  -DT     In Chair notified nsg;sitting;call light within reach;encouraged to call for assist;exit alarm on;with family/caregiver  -DT               User Key  (r) = Recorded By, (t) = Taken By, (c) = Cosigned By      Initials Name Provider Type    DT Ro Ventura, OT Occupational Therapist                   Outcome Measures       Row Name 07/09/24 1032          How much help from another person do you currently need...    Turning from your back to your side while in flat bed without using bedrails? 4  -CR     Moving from lying on back to sitting on the side of a flat bed without bedrails? 3  -CR     Moving to and from a bed to a chair (including a wheelchair)? 3  -CR      Standing up from a chair using your arms (e.g., wheelchair, bedside chair)? 3  -CR     Climbing 3-5 steps with a railing? 3  -CR     To walk in hospital room? 3  -CR     AM-PAC 6 Clicks Score (PT) 19  -CR     Highest Level of Mobility Goal 6 --> Walk 10 steps or more  -CR               User Key  (r) = Recorded By, (t) = Taken By, (c) = Cosigned By      Initials Name Provider Type    CR Reyes, Carmela, PT Physical Therapist                    Occupational Therapy Education       Title: PT OT SLP Therapies (In Progress)       Topic: Occupational Therapy (In Progress)       Point: ADL training (Done)       Description:   Instruct learner(s) on proper safety adaptation and remediation techniques during self care or transfers.   Instruct in proper use of assistive devices.                  Learning Progress Summary             Patient Acceptance, E,TB, VU by DT at 7/9/2024 1524    Comment: Role of OT,goals & POC; safety prec.   Family Acceptance, E,TB, VU by DT at 7/9/2024 1524    Comment: Role of OT,goals & POC; safety prec.                         Point: Home exercise program (Not Started)       Description:   Instruct learner(s) on appropriate technique for monitoring, assisting and/or progressing therapeutic exercises/activities.                  Learner Progress:  Not documented in this visit.              Point: Precautions (Done)       Description:   Instruct learner(s) on prescribed precautions during self-care and functional transfers.                  Learning Progress Summary             Patient Acceptance, E,TB, VU by DT at 7/9/2024 1524    Comment: Role of OT,goals & POC; safety prec.   Family Acceptance, E,TB, VU by DT at 7/9/2024 1524    Comment: Role of OT,goals & POC; safety prec.                         Point: Body mechanics (Done)       Description:   Instruct learner(s) on proper positioning and spine alignment during self-care, functional mobility activities and/or exercises.                   Learning Progress Summary             Patient Acceptance, E,TB, VU by DT at 7/9/2024 1524    Comment: Role of OT,goals & POC; safety prec.   Family Acceptance, E,TB, VU by DT at 7/9/2024 1524    Comment: Role of OT,goals & POC; safety prec.                                         User Key       Initials Effective Dates Name Provider Type Discipline    DT 07/11/23 -  Ro Ventura, OT Occupational Therapist OT                  OT Recommendation and Plan  Planned Therapy Interventions (OT): activity tolerance training, BADL retraining, neuromuscular control/coordination retraining, functional balance retraining, occupation/activity based interventions, patient/caregiver education/training, strengthening exercise, transfer/mobility retraining, ROM/therapeutic exercise, cognitive/visual perception retraining  Therapy Frequency (OT): 3 times/wk  Plan of Care Review  Outcome Evaluation: Pt is an 78 y.o. male with a PMH of Dementia, PVD, bilateral knee OA, prostate cancer, lymphedema, GERD who presented to [facility] on 7/8/2024 after having several falls over the past 3 days. Patient c/o fatigue & light-headedness prior to falling. At baseline pt lives in tri-level home. He primarily stays on the lower floor, but has to go upstairs to bathroom & kitchen. His spouse, son & grandson also live in the home, but they live upstairs. Pt reports he is ind with BADLs & uses a rollator for ambulation. He has no steps to go outdoors, but does have a steep hill to access the driveway. Pt does not drive. Upon eval, pt  is A&O X3. He required v.c. for proper month. He repeats himself at times throughout the session with noted mild STM deficits. Pt completes bed mobility with min A, ADL transfers with CGA using RW & requires LB dressing with min A. Pt presents with forward flexion posturing in standing & has fair+ standing balance & tolerance. OT will continue to follow for tx. Spoke with pt's sister & stressed  concern about pt's ability to ascend/descend steps safely & she stated she can make arrangements with pt's dtr/son at home for pt to function on one level for a brief time to allow him to safely return home & begin home health treatment to assess & tx him in his home environment. Will recommend home with HH upon discharge as long as he can function from 1 level otherwise he may need a short term rehab stay.     Time Calculation:         Time Calculation- OT       Row Name 07/09/24 1525             Time Calculation- OT    OT Start Time 0939  -DT      OT Stop Time 1009  -DT      OT Time Calculation (min) 30 min  -DT      OT Received On 07/09/24  -DT      OT - Next Appointment 07/10/24  -DT      OT Goal Re-Cert Due Date 07/23/24  -DT                User Key  (r) = Recorded By, (t) = Taken By, (c) = Cosigned By      Initials Name Provider Type    Ro Rushing, OT Occupational Therapist                           Ro Ventura, OT  7/9/2024

## 2024-07-09 NOTE — NURSING NOTE
Spoke with patient's daughter who is also a nurse. She stated that the patient lives in a tri-level house with his wife, son and grandson. The patient primarily sleeps in a lazy boy due to his large hiatal hernia.

## 2024-07-09 NOTE — PLAN OF CARE
Goal Outcome Evaluation:  Plan of Care Reviewed With: patient, sibling           Outcome Evaluation: 79 y/o male brought in hospital on 7/8 due to falls and apparently hit his head sustaining laceration R eye. PMH includes dementia, PVD, lymphedema, prostate Ca, OA. Patient lives in lower level of a tri level home by himself while spouse, son and grandson lives on upper level of home. Patient has to go up/down flight of steps to use kitchen and bathroom. Patient using rollator for mobility. At time of eval, patient needed Ella for supine to sit but CGA for transfers and ambulation using rw. Gait is slow with patient keeping rw too far forward however steady and no loss of balance observed. Patient appears to be at his baseline and should be safe to return home provided he can remain on lower level of home at all times. Patient will not be safe to ascend/descend flight of steps on his own. Sister came at end of session and reported she will make arrangements that will allow patient to stay on his level of the house.      Anticipated Discharge Disposition (PT): home with home health

## 2024-07-09 NOTE — CASE MANAGEMENT/SOCIAL WORK
Discharge Planning Assessment   Robin     Patient Name: Lopez Mcdaniel  MRN: [id number]  Today's Date: 7/9/2024    Admit Date: 7/8/2024    Plan: Return home (lives in walkout basement.) Current with [facility] (will need DELVIS order.)   Discharge Needs Assessment       Row Name 07/09/24 1128       Living Environment    People in Home spouse    Name(s) of People in Home Wife and grandson live in home with pt, but on an upper level    Current Living Arrangements home    Potentially Unsafe Housing Conditions none    In the past 12 months has the electric, gas, oil, or water company threatened to shut off services in your home? No    Primary Care Provided by self    Provides Primary Care For no one    Family Caregiver if Needed child(julián), adult    Family Caregiver Names Daughter Fanny, sister Mary    Quality of Family Relationships helpful;involved;supportive    Able to Return to Prior Arrangements yes    Living Arrangement Comments Wife and grandson live in home with pt, but on an upper level of bilevel home       Resource/Environmental Concerns    Resource/Environmental Concerns none    Transportation Concerns none       Transportation Needs    In the past 12 months, has lack of transportation kept you from medical appointments or from getting medications? no    In the past 12 months, has lack of transportation kept you from meetings, work, or from getting things needed for daily living? No       Food Insecurity    Within the past 12 months, you worried that your food would run out before you got the money to buy more. Never true    Within the past 12 months, the food you bought just didn't last and you didn't have money to get more. Never true       Transition Planning    Patient/Family Anticipates Transition to home with family    Patient/Family Anticipated Services at Transition none    Transportation Anticipated family or friend will provide       Discharge Needs Assessment    Readmission Within the Last 30 Days no  previous admission in last 30 days    Equipment Currently Used at Home walker, standard;cane, quad;rollator;cane, straight    Concerns to be Addressed discharge planning    Anticipated Changes Related to Illness none    Equipment Needed After Discharge none                   Discharge Plan       Row Name 07/09/24 1131       Plan    Plan Return home (lives in walkout basement.) Current with [facility] (will need DELVIS order.)    Patient/Family in Agreement with Plan yes    Plan Comments CM met with pt at bedside to discuss discharge needs. Pt lives in walkout basement with wife and grandson living on an upper level, does not drive and is IADLs. PCP and pharmacy verified- pt enrolled in [facility] as requested. No issues stated affording food/ medications or transport, and home environment is safe. Current DME: quad cane, straight cane, rollator, rolling walker. Current with [facility] (will need DELVIS order at [place]) and outpt wound care at [facility]. Family will provide transportation home                  Continued Care and Services - Admitted Since 7/8/2024       Home Medical Care       Service Provider Request Status Selected Services Address Phone Fax Patient Preferred    [facility] P[facility] - [facility] N[facility]A [id number] [facility] [address] [phone] --                  Demographic Summary       Row Name 07/09/24 1127       General Information    Admission Type inpatient    Arrived From emergency department    Required Notices Provided Important Message from Medicare    Referral Source admission list    Reason for Consult discharge planning    Preferred Language English       Contact Information    Permission Granted to Share Info With                    Functional Status       Row Name 07/09/24 1127       Functional Status    Usual Activity Tolerance fair    Current Activity Tolerance poor       Functional Status, IADL    Medications independent    Meal  Preparation assistive person    Housekeeping assistive person    Laundry assistive person    Shopping assistive person       Mental Status    General Appearance WDL WDL       Mental Status Summary    Recent Changes in Mental Status/Cognitive Functioning no changes       Employment/    Current or Previous Occupation not applicable                  Patient Forms       Row Name 07/09/24 1123       Patient Forms    Important Message from Medicare ([facility]) Delivered  PHAM 7-9 per CM    Delivered to Patient    Method of delivery In person                      Roz Farias RN      Office phone: [phone]  Office fax: [phone]

## 2024-07-09 NOTE — THERAPY EVALUATION
Patient Name: Lopez Mcdaniel  : 1945    MRN: [id number]                              Today's Date: 2024       Admit Date: 2024    Visit Dx:     ICD-10-CM ICD-9-CM   1. Multiple falls  R29.6 V15.88   2. Near syncope  R55 780.2   3. Dehydration  E86.0 276.51     Patient Active Problem List   Diagnosis    Near syncope    Recurrent falls    Unsteady gait     History reviewed. No pertinent past medical history.  History reviewed. No pertinent surgical history.   General Information       Row Name 24 1020          Physical Therapy Time and Intention    Document Type evaluation  -CR     Mode of Treatment physical therapy  -CR       Row Name 24 1020          General Information    Patient Profile Reviewed yes  -CR     Prior Level of Function independent:;all household mobility;ADL's  using rollator  -CR     Existing Precautions/Restrictions fall  -CR     Barriers to Rehab cognitive status;environmental barriers  -CR       Row Name 24 1020          Living Environment    People in Home child(julián), adult;grandchild(julián);spouse  patient lives in lower level of tri-level house while rest of family are upstairs  -CR       Row Name 24 1020          Home Main Entrance    Number of Stairs, Main Entrance none  can enter his part of the house from driveway  -CR       Row Name 24 1020          Stairs Within Home, Primary    Stairs, Within Home, Primary 1 flight to kitchen, bathroom  -CR     Stair Railings, Within Home, Primary railings safe and in good condition  -CR       Row Name 24 1020          Cognition    Orientation Status (Cognition) oriented x 4  -CR       Row Name 24 1020          Safety Issues, Functional Mobility    Impairments Affecting Function (Mobility) postural/trunk control;cognition  -CR               User Key  (r) = Recorded By, (t) = Taken By, (c) = Cosigned By      Initials Name Provider Type    CR Reyes, Carmela, PT Physical Therapist                    Mobility       Row Name 07/09/24 1023          Bed Mobility    Bed Mobility supine-sit  -CR     Supine-Sit [place] (Bed Mobility) minimum assist (75% patient effort)  -CR     Assistive Device (Bed Mobility) draw sheet  -CR       Row Name 07/09/24 1023          Bed-Chair Transfer    Bed-Chair [place] (Transfers) contact guard  -CR     Assistive Device (Bed-Chair Transfers) walker, front-wheeled  -CR       Row Name 07/09/24 1023          Sit-Stand Transfer    Sit-Stand [place] (Transfers) contact guard  -CR     Assistive Device (Sit-Stand Transfers) walker, front-wheeled  -CR       Row Name 07/09/24 1023          Gait/Stairs (Locomotion)    [place] Level (Gait) contact guard  -CR     Assistive Device (Gait) walker, front-wheeled  -CR     Distance in Feet (Gait) 30  -CR     Deviations/Abnormal Patterns (Gait) gait speed decreased  -CR     Bilateral Gait Deviations forward flexed posture  keeps rw too far forward  -CR               User Key  (r) = Recorded By, (t) = Taken By, (c) = Cosigned By      Initials Name Provider Type    CR Reyes, Carmela, PT Physical Therapist                   Obj/Interventions       Row Name 07/09/24 1024          Range of Motion Comprehensive    General Range of Motion bilateral lower extremity ROM WFL  -CR       Row Name 07/09/24 1024          Strength Comprehensive (MMT)    Comment, General Manual Muscle Testing (MMT) Assessment BLE grossly 3+/5  -CR       Row Name 07/09/24 1024          Balance    Balance Assessment sitting static balance;standing static balance;standing dynamic balance  -CR     Static Sitting Balance independent  -CR     Position, Sitting Balance sitting edge of bed  -CR     Static Standing Balance modified independence  -CR     Dynamic Standing Balance standby assist  -CR     Position/Device Used, Standing Balance walker, front-wheeled  -CR               User Key  (r) = Recorded By, (t) = Taken By, (c) = Cosigned By      Initials Name Provider Type     CR Reyes, Carmela, PT Physical Therapist                   Goals/Plan       Row Name 07/09/24 1031          Bed Mobility Goal 1 (PT)    Activity/Assistive Device (Bed Mobility Goal 1, PT) supine to sit  -CR     [place] Level/Cues Needed (Bed Mobility Goal 1, PT) standby assist  -CR     Time Frame (Bed Mobility Goal 1, PT) long term goal (LTG);2 weeks  -CR       Row Name 07/09/24 1031          Gait Training Goal 1 (PT)    Activity/Assistive Device (Gait Training Goal 1, PT) gait (walking locomotion);assistive device use;increase endurance/gait distance;walker, rolling  -CR     [place] Level (Gait Training Goal 1, PT) standby assist  -CR     Distance (Gait Training Goal 1, PT) 100 x 2  -CR     Time Frame (Gait Training Goal 1, PT) long term goal (LTG);2 weeks  -CR       Row Name 07/09/24 1031          Patient Education Goal (PT)    Activity (Patient Education Goal, PT) HEP  -CR     [place]/Cues/Accuracy (Memory Goal 2, PT) demonstrates adequately  -CR     Time Frame (Patient Education Goal, PT) long term goal (LTG);2 weeks  -CR       Row Name 07/09/24 1031          Therapy Assessment/Plan (PT)    Planned Therapy Interventions (PT) bed mobility training;gait training;home exercise program;patient/family education  -CR               User Key  (r) = Recorded By, (t) = Taken By, (c) = Cosigned By      Initials Name Provider Type    CR Reyes, Carmela, PT Physical Therapist                   Clinical Impression       Row Name 07/09/24 1024          Pain    Additional Documentation Pain Scale: FACES Pre/Post-Treatment (Group)  -CR       Row Name 07/09/24 1024          Pain Scale: FACES Pre/Post-Treatment    Pain: FACES Scale, Pretreatment 2-->hurts little bit  -CR     Posttreatment Pain Rating 2-->hurts little bit  -CR     Pre/Posttreatment Pain Comment wound sites L elbow, above R eye  -CR       Row Name 07/09/24 1024          Plan of Care Review    Plan of Care Reviewed With patient;sibling  -CR      Outcome Evaluation 77 y/o male brought in hospital on 7/8 due to falls and apparently hit his head sustaining laceration R eye. PMH includes dementia, PVD, lymphedema, prostate Ca, OA. Patient lives in lower level of a tri level home by himself while spouse, son and grandson lives on upper level of home. Patient has to go up/down flight of steps to use kitchen and bathroom. Patient using rollator for mobility. At time of eval, patient needed Ella for supine to sit but CGA for transfers and ambulation using rw. Gait is slow with patient keeping rw too far forward however steady and no loss of balance observed. Patient appears to be at his baseline and should be safe to return home provided he can remain on lower level of home at all times. Patient will not be safe to ascend/descend flight of steps on his own. Sister came at end of session and reported she will make arrangements that will allow patient to stay on his level of the house.  -CR       Row Name 07/09/24 1024          Therapy Assessment/Plan (PT)    Patient/Family Therapy Goals Statement (PT) home  -CR     Rehab Potential (PT) good, to achieve stated therapy goals  -CR     Criteria for Skilled Interventions Met (PT) yes;skilled treatment is necessary  -CR     Therapy Frequency (PT) 3 times/wk  -CR     Predicted Duration of Therapy Intervention (PT) dc  -CR               User Key  (r) = Recorded By, (t) = Taken By, (c) = Cosigned By      Initials Name Provider Type    CR Reyes, Carmela, PT Physical Therapist                   Outcome Measures       Row Name 07/09/24 1032 07/09/24 0100       How much help from another person do you currently need...    Turning from your back to your side while in flat bed without using bedrails? 4  -CR 3  -SS    Moving from lying on back to sitting on the side of a flat bed without bedrails? 3  -CR 3  -SS    Moving to and from a bed to a chair (including a wheelchair)? 3  -CR 2  -SS    Standing up from a chair using your arms  (e.g., wheelchair, bedside chair)? 3  -CR 2  -SS    Climbing 3-5 steps with a railing? 3  -CR 2  -SS    To walk in hospital room? 3  -CR 2  -SS    AM-PAC 6 Clicks Score (PT) 19  -CR 14  -SS    Highest Level of Mobility Goal 6 --> Walk 10 steps or more  -CR 4 --> Transfer to chair/commode  -SS      Row Name 07/08/24 2248 07/08/24 2243       How much help from another person do you currently need...    Turning from your back to your side while in flat bed without using bedrails? 3  -GH 3  -GH    Moving from lying on back to sitting on the side of a flat bed without bedrails? 3  -GH 3  -GH    Moving to and from a bed to a chair (including a wheelchair)? 2  -GH 2  -GH    Standing up from a chair using your arms (e.g., wheelchair, bedside chair)? 2  -GH 2  -GH    Climbing 3-5 steps with a railing? 2  -GH 2  -GH    To walk in hospital room? 2  -GH 2  -GH    AM-PAC 6 Clicks Score (PT) 14  -GH 14  -GH    Highest Level of Mobility Goal 4 --> Transfer to chair/commode  -GH 4 --> Transfer to chair/commode  -GH              User Key  (r) = Recorded By, (t) = Taken By, (c) = Cosigned By      Initials Name Provider Type    CR Reyes, Carmela, PT Physical Therapist    Martha Serrano, RN Registered Nurse     Enrico Avila RN Registered Nurse                                 Physical Therapy Education       Title: PT OT SLP Therapies (In Progress)       Topic: Physical Therapy (In Progress)       Point: Mobility training (Done)       Learning Progress Summary             Patient Acceptance, E, VU by CR at 7/9/2024 1032   Family Acceptance, E, VU by CR at 7/9/2024 1032                         Point: Home exercise program (Not Started)       Learner Progress:  Not documented in this visit.                              User Key       Initials Effective Dates Name Provider Type Discipline     06/16/21 -  Reyes, Carmela, PT Physical Therapist PT                  PT Recommendation and Plan  Planned Therapy Interventions (PT): bed  mobility training, gait training, home exercise program, patient/family education  Plan of Care Reviewed With: patient, sibling  Outcome Evaluation: 79 y/o male brought in hospital on 7/8 due to falls and apparently hit his head sustaining laceration R eye. PMH includes dementia, PVD, lymphedema, prostate Ca, OA. Patient lives in lower level of a tri level home by himself while spouse, son and grandson lives on upper level of home. Patient has to go up/down flight of steps to use kitchen and bathroom. Patient using rollator for mobility. At time of eval, patient needed Ella for supine to sit but CGA for transfers and ambulation using rw. Gait is slow with patient keeping rw too far forward however steady and no loss of balance observed. Patient appears to be at his baseline and should be safe to return home provided he can remain on lower level of home at all times. Patient will not be safe to ascend/descend flight of steps on his own. Sister came at end of session and reported she will make arrangements that will allow patient to stay on his level of the house.     Time Calculation:         PT Charges       Row Name 07/09/24 1032             Time Calculation    Start Time 0940  -CR      Stop Time 1004  -CR      Time Calculation (min) 24 min  -CR      PT Received On 07/09/24  -CR      PT - Next Appointment 07/10/24  -CR      PT Goal Re-Cert Due Date 07/23/24  -CR         Time Calculation- PT    Total Timed Code Minutes- PT 0 minute(s)  -CR                User Key  (r) = Recorded By, (t) = Taken By, (c) = Cosigned By      Initials Name Provider Type    CR Reyes, Carmela, PT Physical Therapist                  Therapy Charges for Today       Code Description Service Date Service Provider Modifiers Qty    [id number]  PT EVAL LOW COMPLEXITY 4 7/9/2024 Reyes, Carmela, PT GP 1            PT G-Codes  AM-PAC 6 Clicks Score (PT): 19  PT Discharge Summary  Anticipated Discharge Disposition (PT): home with home  health    Carmela Reyes, PT  7/9/2024

## 2024-07-09 NOTE — PLAN OF CARE
Goal Outcome Evaluation:              Outcome Evaluation: Pt is an 78 y.o. male with a PMH of Dementia, PVD, bilateral knee OA, prostate cancer, lymphedema, GERD who presented to [facility] on 7/8/2024 after having several falls over the past 3 days. Patient c/o fatigue & light-headedness prior to falling. At baseline pt lives in tri-level home. He primarily stays on the lower floor, but has to go upstairs to bathroom & kitchen. His spouse, son & grandson also live in the home, but they live upstairs. Pt reports he is ind with BADLs & uses a rollator for ambulation. He has no steps to go outdoors, but does have a steep hill to access the driveway. Pt does not drive. Upon eval, pt  is A&O X3. He required v.c. for proper month. He repeats himself at times throughout the session with noted mild STM deficits. Pt completes bed mobility with min A, ADL transfers with CGA using RW & requires LB dressing with min A. Pt presents with forward flexion posturing in standing & has fair+ standing balance & tolerance. OT will continue to follow for tx. Spoke with pt's sister & stressed concern about pt's ability to ascend/descend steps safely & she stated she can make arrangements with pt's dtr/son at home for pt to function on one level for a brief time to allow him to safely return home & begin home health treatment to assess & tx him in his home environment. Will recommend home with HH upon discharge as long as he can function from 1 level otherwise he may need a short term rehab stay.      Anticipated Discharge Disposition (OT): home with assist, home with home health

## 2024-07-10 ENCOUNTER — APPOINTMENT (OUTPATIENT)
Dept: CARDIOLOGY | Facility: HOSPITAL | Age: 79
End: 2024-07-10
Payer: MEDICARE

## 2024-07-10 VITALS
DIASTOLIC BLOOD PRESSURE: 58 MMHG | HEART RATE: 65 BPM | HEIGHT: 70 IN | SYSTOLIC BLOOD PRESSURE: 101 MMHG | OXYGEN SATURATION: 92 % | TEMPERATURE: 97.9 F | WEIGHT: 144.9 LBS | BODY MASS INDEX: 20.75 KG/M2 | RESPIRATION RATE: 17 BRPM

## 2024-07-10 LAB
AORTIC DIMENSIONLESS INDEX: 0.89 (DI)
BH CV ECHO MEAS - AI P1/2T: 648.9 MSEC
BH CV ECHO MEAS - AO MAX PG: 4.8 MMHG
BH CV ECHO MEAS - AO MEAN PG: 3 MMHG
BH CV ECHO MEAS - AO V2 MAX: 109 CM/SEC
BH CV ECHO MEAS - AO V2 VTI: 24.1 CM
BH CV ECHO MEAS - AVA(I,D): 2.7 CM2
BH CV ECHO MEAS - EDV(CUBED): 64 ML
BH CV ECHO MEAS - EDV(MOD-SP2): 75.3 ML
BH CV ECHO MEAS - EDV(MOD-SP4): 110 ML
BH CV ECHO MEAS - EF(MOD-BP): 68 %
BH CV ECHO MEAS - EF(MOD-SP2): 65.2 %
BH CV ECHO MEAS - EF(MOD-SP4): 67.7 %
BH CV ECHO MEAS - ESV(CUBED): 27 ML
BH CV ECHO MEAS - ESV(MOD-SP2): 26.2 ML
BH CV ECHO MEAS - ESV(MOD-SP4): 35.5 ML
BH CV ECHO MEAS - FS: 25 %
BH CV ECHO MEAS - IVS/LVPW: 1.38 CM
BH CV ECHO MEAS - IVSD: 1.1 CM
BH CV ECHO MEAS - LA DIMENSION: 2.8 CM
BH CV ECHO MEAS - LAT PEAK E' VEL: 5.7 CM/SEC
BH CV ECHO MEAS - LV DIASTOLIC VOL/BSA (35-75): 57.9 CM2
BH CV ECHO MEAS - LV MASS(C)D: 118.2 GRAMS
BH CV ECHO MEAS - LV MAX PG: 3.8 MMHG
BH CV ECHO MEAS - LV MEAN PG: 2 MMHG
BH CV ECHO MEAS - LV SYSTOLIC VOL/BSA (12-30): 18.7 CM2
BH CV ECHO MEAS - LV V1 MAX: 97.5 CM/SEC
BH CV ECHO MEAS - LV V1 VTI: 20.8 CM
BH CV ECHO MEAS - LVIDD: 4 CM
BH CV ECHO MEAS - LVIDS: 3 CM
BH CV ECHO MEAS - LVOT AREA: 3.1 CM2
BH CV ECHO MEAS - LVOT DIAM: 2 CM
BH CV ECHO MEAS - LVPWD: 0.8 CM
BH CV ECHO MEAS - MED PEAK E' VEL: 7.1 CM/SEC
BH CV ECHO MEAS - MV A DUR: 0.15 SEC
BH CV ECHO MEAS - MV A MAX VEL: 80.7 CM/SEC
BH CV ECHO MEAS - MV DEC SLOPE: 155 CM/SEC2
BH CV ECHO MEAS - MV DEC TIME: 0.27 SEC
BH CV ECHO MEAS - MV E MAX VEL: 67.1 CM/SEC
BH CV ECHO MEAS - MV E/A: 0.83
BH CV ECHO MEAS - MV MAX PG: 3.1 MMHG
BH CV ECHO MEAS - MV MEAN PG: 1 MMHG
BH CV ECHO MEAS - MV P1/2T: 125.3 MSEC
BH CV ECHO MEAS - MV V2 VTI: 28.9 CM
BH CV ECHO MEAS - MVA(P1/2T): 1.76 CM2
BH CV ECHO MEAS - MVA(VTI): 2.26 CM2
BH CV ECHO MEAS - PA ACC TIME: 0.09 SEC
BH CV ECHO MEAS - PA V2 MAX: 88.4 CM/SEC
BH CV ECHO MEAS - PULM A REVS DUR: 0.17 SEC
BH CV ECHO MEAS - PULM A REVS VEL: 42.3 CM/SEC
BH CV ECHO MEAS - PULM DIAS VEL: 29 CM/SEC
BH CV ECHO MEAS - PULM S/D: 1.39
BH CV ECHO MEAS - PULM SYS VEL: 40.2 CM/SEC
BH CV ECHO MEAS - RV MAX PG: 2.9 MMHG
BH CV ECHO MEAS - RV V1 MAX: 84.6 CM/SEC
BH CV ECHO MEAS - RV V1 VTI: 14.7 CM
BH CV ECHO MEAS - SV(LVOT): 65.3 ML
BH CV ECHO MEAS - SV(MOD-SP2): 49.1 ML
BH CV ECHO MEAS - SV(MOD-SP4): 74.5 ML
BH CV ECHO MEAS - SVI(LVOT): 34.4 ML/M2
BH CV ECHO MEAS - SVI(MOD-SP2): 25.9 ML/M2
BH CV ECHO MEAS - SVI(MOD-SP4): 39.2 ML/M2
BH CV ECHO MEAS - TAPSE (>1.6): 3 CM
BH CV ECHO MEAS - TR MAX PG: 36 MMHG
BH CV ECHO MEAS - TR MAX VEL: 300 CM/SEC
BH CV ECHO MEASUREMENTS AVERAGE E/E' RATIO: 10.48
BH CV XLRA - TDI S': 12.7 CM/SEC
LEFT ATRIUM VOLUME INDEX: 21.9 ML/M2
SINUS: 4 CM
STJ: 3.2 CM

## 2024-07-10 PROCEDURE — 93306 TTE W/DOPPLER COMPLETE: CPT

## 2024-07-10 PROCEDURE — 97530 THERAPEUTIC ACTIVITIES: CPT

## 2024-07-10 PROCEDURE — 93306 TTE W/DOPPLER COMPLETE: CPT | Performed by: INTERNAL MEDICINE

## 2024-07-10 PROCEDURE — 97116 GAIT TRAINING THERAPY: CPT

## 2024-07-10 PROCEDURE — 25010000002 SULFUR HEXAFLUORIDE MICROSPH 60.7-25 MG RECONSTITUTED SUSPENSION: Performed by: INTERNAL MEDICINE

## 2024-07-10 PROCEDURE — 25010000002 ENOXAPARIN PER 10 MG: Performed by: STUDENT IN AN ORGANIZED HEALTH CARE EDUCATION/TRAINING PROGRAM

## 2024-07-10 RX ADMIN — ENOXAPARIN SODIUM 40 MG: 100 INJECTION SUBCUTANEOUS at 15:36

## 2024-07-10 RX ADMIN — SULFUR HEXAFLUORIDE 3 ML: KIT at 13:31

## 2024-07-10 RX ADMIN — Medication 10 ML: at 08:29

## 2024-07-10 NOTE — SIGNIFICANT NOTE
07/10/24 1300   OTHER   Discipline occupational therapist   Rehab Time/Intention   Session Not Performed other (see comments)  (Pt undergoing procedure, will f/u as time permits.)   Therapy Assessment/Plan (PT)   Criteria for Skilled Interventions Met (PT) yes;skilled treatment is necessary   Recommendation   OT - Next Appointment 07/11/24

## 2024-07-10 NOTE — PLAN OF CARE
Goal Outcome Evaluation:      No changes at this time,VSS, no complaints of pain this shift,cont plan of care

## 2024-07-10 NOTE — THERAPY TREATMENT NOTE
"Subjective: Pt agreeable to therapeutic plan of care. Pt's sister states he was able to rest for awhile prior to PT.    Objective:     Bed mobility - SBA to sit EOB; Pt elected to return to bed at end of tx and required light MIN A for lifting legs into bed    Transfers - SBA/CGA and with rolling walker for SIT>STAND from EOB. Pt required cues for correct hand placement.    Ambulation - 350 feet CGA progressing to SBA with rolling walker. Pt amb with forward flexed posture, slow speed, and needs cues for maintaining correct proximity to walker. Pt states this is how he amb at baseline but usually uses a rollator at home.    Therapeutic Exercise - 10 Reps B LE AROM unsupported sitting / EOB (LAQs, ankle pumps)    Vitals: WNL    Pain: 0 VAS       Education: Provided education on the importance of mobility in the acute care setting, Verbal/Tactile Cues, Transfer Training, Gait Training, and Energy conservation strategies    Assessment: Lopez Mcdaniel presents with functional mobility impairments which indicate the need for skilled intervention.  Pt with significant progress in mobility this date. Pt increased gait distance to ~350 ft with SBA using Rwx.  Pt is most likely at or near his baseline. Pt's sister states he may DC home today. However, if pt still in hospital PT will plan on doing stair training next visit. Tolerating session today without incident. Will continue to follow and progress as tolerated.     Plan/Recommendations:   If medically appropriate, Low Intensity Therapy recommended post-acute care - This is recommended as therapy feels this patient would require 2-3 visits per week. OP or HH would be the best option depending on patient's home bound status. Consider, if the patient has other  \"skilled\" needs such as wounds, IV antibiotics, etc. Combined with \"low intensity\" could also equate to a SNF. If patient is medically complex, consider LTAC. Pt requires no DME at discharge.     Pt desires Home with " Home Health and Home with family assist at discharge. Pt cooperative; agreeable to therapeutic recommendations and plan of care.         Basic Mobility 6-click:  Rollin = Total, A lot = 2, A little = 3; 4 = None  Supine>Sit:   1 = Total, A lot = 2, A little = 3; 4 = None   Sit>Stand with arms:  1 = Total, A lot = 2, A little = 3; 4 = None  Bed>Chair:   1 = Total, A lot = 2, A little = 3; 4 = None  Ambulate in room:  1 = Total, A lot = 2, A little = 3; 4 = None  3-5 Steps with railin = Total, A lot = 2, A little = 3; 4 = None  Score: 18    Modified Shira: N/A = No pre-op stroke/TIA    Post-Tx Position: Supine with HOB Elevated  PPE: gloves

## 2024-07-10 NOTE — NURSING NOTE
78-year-old male presents to the hospital with weakness and falls.  Patient is current with outpatient wound care.  Patient was in the interim of transferring to a lymphedema clinic per family that is now stopped.  They will be returning to home health and outpatient wound care.  The EMR was reviewed.  They report no new issues.  Patient has a history of dementia.    Apparently family change the dressings at home prior to his admission as they no longer had home health and were not able to be seen by outpatient care.  There was a calamine Viscopaste dressing applied in multiple layers and a 3 layer multilayer compression hide the dressing did have some adherence.  It was gently removed.  There are some superficially denuded areas to the right lower extremity edema appears to be well-controlled.  The left lower extremity has a few more superficially denuded areas noted.  Really scant exudate is noted from any of the areas.  No warmth erythema induration is noted.  There is a dry intact scab to the right knee the left knee does have an open area.  I did apply a silicone border foam dressing to this area as it is above the 2 layer.  I then applied Xeroform gauze and I wrapped with a 2 layer compression dressing.  Patient tolerated well.  Voices no complaints.  Patient will need to follow-up and return to outpatient wound care after discharge

## 2024-07-10 NOTE — PROGRESS NOTES
[facility] MEDICINE SERVICE  DAILY PROGRESS NOTE    NAME: Lopez Mcdaniel  : 1945  MRN: [id number]      LOS: 1 day     PROVIDER OF SERVICE: Lori Manrique MD    Chief Complaint: Near syncope    Subjective:     Interval History:  History taken from: patient  Patient Complaints: No new complaints      Review of Systems:   Review of Systems   All other systems reviewed and are negative.      Objective:     Vital Signs  Temp:  [97.2 °F (36.2 °C)-98 °F (36.7 °C)] 97.9 °F (36.6 °C)  Heart Rate:  [56-65] 65  Resp:  [13-18] 17  BP: ()/(55-75) 131/62   Body mass index is 20.79 kg/m².    Physical Exam  Physical Exam  Constitutional:       Appearance: Normal appearance.   HENT:      Head: Normocephalic and atraumatic.      Nose: Nose normal.      Mouth/Throat:      Mouth: Mucous membranes are moist.   Eyes:      Extraocular Movements: Extraocular movements intact.      Conjunctiva/sclera: Conjunctivae normal.      Pupils: Pupils are equal, round, and reactive to light.   Cardiovascular:      Rate and Rhythm: Normal rate and regular rhythm.      Pulses: Normal pulses.      Heart sounds: Normal heart sounds.   Pulmonary:      Effort: Pulmonary effort is normal.      Breath sounds: Normal breath sounds.   Abdominal:      General: Abdomen is flat. Bowel sounds are normal.      Palpations: Abdomen is soft.   Musculoskeletal:         General: Normal range of motion.      Cervical back: Normal range of motion and neck supple.   Skin:     General: Skin is warm and dry.      Capillary Refill: Capillary refill takes less than 2 seconds.   Neurological:      General: No focal deficit present.      Mental Status: He is alert and oriented to person, place, and time.   Psychiatric:         Mood and Affect: Mood normal.         Behavior: Behavior normal.         Thought Content: Thought content normal.         Judgment: Judgment normal.         Scheduled Meds   enoxaparin, 40 mg, Subcutaneous, Q24H  sodium chloride,  10 mL, Intravenous, Q12H       PRN Meds     senna-docusate sodium **AND** polyethylene glycol **AND** bisacodyl **AND** bisacodyl    Calcium Replacement - Follow Nurse / BPA Driven Protocol    Magnesium Standard Dose Replacement - Follow Nurse / BPA Driven Protocol    Phosphorus Replacement - Follow Nurse / BPA Driven Protocol    Potassium Replacement - Follow Nurse / BPA Driven Protocol    sodium chloride    sodium chloride   Infusions         Diagnostic Data    Results from last 7 days   Lab Units 07/09/24  1629 07/08/24  1251   WBC 10*3/mm3  --  5.59   HEMOGLOBIN g/dL  --  13.2   HEMATOCRIT %  --  41.9   PLATELETS 10*3/mm3  --  157   GLUCOSE mg/dL 79 93   CREATININE mg/dL 0.97 1.29*   BUN mg/dL 14 22   SODIUM mmol/L 142 140   POTASSIUM mmol/L 4.3 3.3*   AST (SGOT) U/L  --  33   ALT (SGPT) U/L  --  23   ALK PHOS U/L  --  89   BILIRUBIN mg/dL  --  1.0   ANION GAP mmol/L 9.9 9.5       XR Pelvis 1 or 2 View    Result Date: 7/9/2024  Impression: 1. No visible fracture. 2. Incidentally noted advanced lower lumbar degenerative disc disease. Electronically Signed: Miles Hess MD  7/9/2024 7:58 AM EDT  Workstation ID: [id number]    XR Chest 1 View    Result Date: 7/9/2024  Impression: 1.No acute cardiopulmonary abnormality. 2.Large hiatal hernia. Electronically Signed: Guanako Gonzalez MD  7/9/2024 5:54 AM EDT  Workstation ID: [id number]    CT Head Without Contrast    Result Date: 7/9/2024  Impression: 1.No acute intracranial abnormality. 2.Mild chronic small vessel ischemic change. Electronically Signed: Guanako Gonzalez MD  7/9/2024 5:12 AM EDT  Workstation ID: [id number]    CT Cervical Spine Without Contrast    Result Date: 7/9/2024  Impression: 1.No acute osseous abnormality. 2.Severe cervical spondylosis with multilevel spondylolisthesis and varying degrees of neuroforaminal narrowing. 3.Moderate bilateral carotid bifurcation calcification. Electronically Signed: Guanako Gonzalez MD  7/9/2024 5:11 AM EDT  Workstation ID: [id number]        I reviewed the patient's new clinical results.    Assessment/Plan:     Active and Resolved Problems  Active Hospital Problems    Diagnosis  POA    **Near syncope [R55]  Yes    Recurrent falls [R29.6]  Not Applicable    Unsteady gait [R26.81]  Unknown      Resolved Hospital Problems   No resolved problems to display.       Check orthostatics vital signs due to near syncope.  Order an echocardiogram.  Consult physical therapy to evaluate gait.    VTE Prophylaxis:  Pharmacologic VTE prophylaxis orders are present.         Code status is   There are no questions and answers to display.       Plan for disposition: Discharge home in 1 to 2 days.      Time: 35 minutes    Signature: Electronically signed by Lori Manrique MD, 07/10/24, 11:22 EDT.  [facility], [place], operated by [facility] Hospitalist Team

## 2024-07-10 NOTE — PLAN OF CARE
"Assessment: Lopez Mcdaniel presents with functional mobility impairments which indicate the need for skilled intervention.  Pt with significant progress in mobility this date. Pt increased gait distance to ~350 ft with SBA using Rwx.  Pt is most likely at or near his baseline. Pt's sister states he may DC home today. However, if pt still in hospital PT will plan on doing stair training next visit. Tolerating session today without incident. Will continue to follow and progress as tolerated.     Plan/Recommendations:   If medically appropriate, Low Intensity Therapy recommended post-acute care - This is recommended as therapy feels this patient would require 2-3 visits per week. OP or HH would be the best option depending on patient's home bound status. Consider, if the patient has other  \"skilled\" needs such as wounds, IV antibiotics, etc. Combined with \"low intensity\" could also equate to a SNF. If patient is medically complex, consider LTAC. Pt requires no DME at discharge.     Pt desires Home with Home Health and Home with family assist at discharge. Pt cooperative; agreeable to therapeutic recommendations and plan of care.                                               "

## 2024-07-11 NOTE — CASE MANAGEMENT/SOCIAL WORK
Case Management Discharge Note      Final Note: Home with [facility]         Selected Continued Care - Discharged on 7/10/2024 Admission date: 7/8/2024 - Discharge disposition: Home or Self Care        Home Medical Care Coordination complete.      Service Provider Selected Services Address Phone Fax Patient Preferred    [facility] ,  Home [facility] [address] [facility], [facility] [address] [place] [id number] [phone] --                      Transportation Services  Private: Car    Final Discharge Disposition Code: 06 - home with home health care

## 2024-07-12 ENCOUNTER — OFFICE VISIT (OUTPATIENT)
Dept: WOUND CARE | Facility: HOSPITAL | Age: 79
End: 2024-07-12
Payer: MEDICARE

## 2024-07-19 ENCOUNTER — OFFICE VISIT (OUTPATIENT)
Dept: WOUND CARE | Facility: HOSPITAL | Age: 79
End: 2024-07-19
Payer: MEDICARE

## 2024-08-02 ENCOUNTER — OFFICE VISIT (OUTPATIENT)
Dept: WOUND CARE | Facility: HOSPITAL | Age: 79
End: 2024-08-02
Payer: MEDICARE

## 2024-08-02 PROCEDURE — 17250 CHEM CAUT OF GRANLTJ TISSUE: CPT

## 2024-08-16 ENCOUNTER — OFFICE VISIT (OUTPATIENT)
Dept: WOUND CARE | Facility: HOSPITAL | Age: 79
End: 2024-08-16
Payer: MEDICARE

## 2024-08-16 PROCEDURE — G0463 HOSPITAL OUTPT CLINIC VISIT: HCPCS
